# Patient Record
Sex: FEMALE | Race: WHITE | NOT HISPANIC OR LATINO | Employment: FULL TIME | URBAN - METROPOLITAN AREA
[De-identification: names, ages, dates, MRNs, and addresses within clinical notes are randomized per-mention and may not be internally consistent; named-entity substitution may affect disease eponyms.]

---

## 2024-07-25 ENCOUNTER — EVALUATION (OUTPATIENT)
Dept: PHYSICAL THERAPY | Facility: REHABILITATION | Age: 29
End: 2024-07-25
Payer: COMMERCIAL

## 2024-07-25 DIAGNOSIS — M54.12 CERVICAL RADICULOPATHY: Primary | ICD-10-CM

## 2024-07-25 DIAGNOSIS — M25.511 RIGHT SHOULDER PAIN, UNSPECIFIED CHRONICITY: ICD-10-CM

## 2024-07-25 PROCEDURE — 97161 PT EVAL LOW COMPLEX 20 MIN: CPT | Performed by: PHYSICAL THERAPIST

## 2024-07-25 PROCEDURE — 97110 THERAPEUTIC EXERCISES: CPT | Performed by: PHYSICAL THERAPIST
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
## 2024-07-30 ENCOUNTER — OFFICE VISIT (OUTPATIENT)
Dept: PHYSICAL THERAPY | Facility: REHABILITATION | Age: 29
End: 2024-07-30
Payer: COMMERCIAL

## 2024-07-30 DIAGNOSIS — M25.511 RIGHT SHOULDER PAIN, UNSPECIFIED CHRONICITY: ICD-10-CM

## 2024-07-30 DIAGNOSIS — M54.12 CERVICAL RADICULOPATHY: Primary | ICD-10-CM

## 2024-07-30 PROCEDURE — 97112 NEUROMUSCULAR REEDUCATION: CPT

## 2024-07-30 PROCEDURE — 97110 THERAPEUTIC EXERCISES: CPT

## 2024-07-30 PROCEDURE — 97140 MANUAL THERAPY 1/> REGIONS: CPT
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
## 2024-08-01 ENCOUNTER — OFFICE VISIT (OUTPATIENT)
Dept: PHYSICAL THERAPY | Facility: REHABILITATION | Age: 29
End: 2024-08-01
Payer: COMMERCIAL

## 2024-08-01 DIAGNOSIS — M25.511 RIGHT SHOULDER PAIN, UNSPECIFIED CHRONICITY: ICD-10-CM

## 2024-08-01 DIAGNOSIS — M54.12 CERVICAL RADICULOPATHY: Primary | ICD-10-CM

## 2024-08-01 PROCEDURE — 97110 THERAPEUTIC EXERCISES: CPT

## 2024-08-01 PROCEDURE — 97140 MANUAL THERAPY 1/> REGIONS: CPT

## 2024-08-01 PROCEDURE — 97112 NEUROMUSCULAR REEDUCATION: CPT
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
## 2024-08-05 ENCOUNTER — OFFICE VISIT (OUTPATIENT)
Dept: PHYSICAL THERAPY | Facility: REHABILITATION | Age: 29
End: 2024-08-05
Payer: COMMERCIAL

## 2024-08-05 DIAGNOSIS — M25.511 RIGHT SHOULDER PAIN, UNSPECIFIED CHRONICITY: ICD-10-CM

## 2024-08-05 DIAGNOSIS — M54.12 CERVICAL RADICULOPATHY: Primary | ICD-10-CM

## 2024-08-05 PROCEDURE — 97140 MANUAL THERAPY 1/> REGIONS: CPT | Performed by: PHYSICAL THERAPIST

## 2024-08-05 PROCEDURE — 97112 NEUROMUSCULAR REEDUCATION: CPT | Performed by: PHYSICAL THERAPIST

## 2024-08-05 PROCEDURE — 97110 THERAPEUTIC EXERCISES: CPT | Performed by: PHYSICAL THERAPIST
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
## 2024-08-08 ENCOUNTER — APPOINTMENT (OUTPATIENT)
Dept: PHYSICAL THERAPY | Facility: REHABILITATION | Age: 29
End: 2024-08-08
Payer: COMMERCIAL

## 2024-08-12 ENCOUNTER — OFFICE VISIT (OUTPATIENT)
Dept: PHYSICAL THERAPY | Facility: REHABILITATION | Age: 29
End: 2024-08-12
Payer: COMMERCIAL

## 2024-08-12 DIAGNOSIS — M25.511 RIGHT SHOULDER PAIN, UNSPECIFIED CHRONICITY: ICD-10-CM

## 2024-08-12 DIAGNOSIS — M54.12 CERVICAL RADICULOPATHY: Primary | ICD-10-CM

## 2024-08-12 PROCEDURE — 97110 THERAPEUTIC EXERCISES: CPT | Performed by: PHYSICAL THERAPIST

## 2024-08-12 PROCEDURE — 97140 MANUAL THERAPY 1/> REGIONS: CPT | Performed by: PHYSICAL THERAPIST
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
## 2024-08-15 ENCOUNTER — APPOINTMENT (OUTPATIENT)
Dept: PHYSICAL THERAPY | Facility: REHABILITATION | Age: 29
End: 2024-08-15
Payer: COMMERCIAL

## 2024-08-19 ENCOUNTER — OFFICE VISIT (OUTPATIENT)
Dept: PHYSICAL THERAPY | Facility: REHABILITATION | Age: 29
End: 2024-08-19
Payer: COMMERCIAL

## 2024-08-19 DIAGNOSIS — M25.511 RIGHT SHOULDER PAIN, UNSPECIFIED CHRONICITY: ICD-10-CM

## 2024-08-19 DIAGNOSIS — M54.12 CERVICAL RADICULOPATHY: Primary | ICD-10-CM

## 2024-08-19 PROCEDURE — 97110 THERAPEUTIC EXERCISES: CPT

## 2024-08-19 PROCEDURE — 97140 MANUAL THERAPY 1/> REGIONS: CPT

## 2024-08-19 PROCEDURE — 97112 NEUROMUSCULAR REEDUCATION: CPT
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
## 2024-08-22 ENCOUNTER — EVALUATION (OUTPATIENT)
Dept: PHYSICAL THERAPY | Facility: REHABILITATION | Age: 29
End: 2024-08-22
Payer: COMMERCIAL

## 2024-08-22 DIAGNOSIS — M54.12 CERVICAL RADICULOPATHY: Primary | ICD-10-CM

## 2024-08-22 DIAGNOSIS — M25.511 RIGHT SHOULDER PAIN, UNSPECIFIED CHRONICITY: ICD-10-CM

## 2024-08-22 PROCEDURE — 97140 MANUAL THERAPY 1/> REGIONS: CPT | Performed by: PHYSICAL THERAPIST
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
## 2024-08-26 ENCOUNTER — APPOINTMENT (OUTPATIENT)
Dept: PHYSICAL THERAPY | Facility: REHABILITATION | Age: 29
End: 2024-08-26
Payer: COMMERCIAL

## 2024-08-26 ENCOUNTER — OFFICE VISIT (OUTPATIENT)
Dept: OBGYN CLINIC | Facility: MEDICAL CENTER | Age: 29
End: 2024-08-26
Payer: COMMERCIAL

## 2024-08-26 VITALS
WEIGHT: 149 LBS | HEIGHT: 66 IN | SYSTOLIC BLOOD PRESSURE: 127 MMHG | HEART RATE: 92 BPM | BODY MASS INDEX: 23.95 KG/M2 | DIASTOLIC BLOOD PRESSURE: 85 MMHG

## 2024-08-26 DIAGNOSIS — M54.12 RADICULOPATHY, CERVICAL REGION: Primary | ICD-10-CM

## 2024-08-26 PROCEDURE — 20610 DRAIN/INJ JOINT/BURSA W/O US: CPT | Performed by: ORTHOPAEDIC SURGERY

## 2024-08-26 PROCEDURE — 99213 OFFICE O/P EST LOW 20 MIN: CPT | Performed by: ORTHOPAEDIC SURGERY

## 2024-08-26 RX ORDER — BUPIVACAINE HYDROCHLORIDE 2.5 MG/ML
2 INJECTION, SOLUTION INFILTRATION; PERINEURAL
Status: COMPLETED | OUTPATIENT
Start: 2024-08-26 | End: 2024-08-26

## 2024-08-26 RX ORDER — BUTALBITAL, ACETAMINOPHEN AND CAFFEINE 50; 325; 40 MG/1; MG/1; MG/1
1 TABLET ORAL
COMMUNITY
Start: 2024-08-15

## 2024-08-26 RX ORDER — METHYLPREDNISOLONE ACETATE 40 MG/ML
1 INJECTION, SUSPENSION INTRA-ARTICULAR; INTRALESIONAL; INTRAMUSCULAR; SOFT TISSUE
Status: COMPLETED | OUTPATIENT
Start: 2024-08-26 | End: 2024-08-26

## 2024-08-26 RX ORDER — ONDANSETRON 4 MG/1
TABLET, ORALLY DISINTEGRATING ORAL
COMMUNITY
Start: 2024-06-14

## 2024-08-26 RX ORDER — CYCLOBENZAPRINE HCL 5 MG
TABLET ORAL
COMMUNITY
Start: 2024-07-22

## 2024-08-26 RX ADMIN — METHYLPREDNISOLONE ACETATE 1 ML: 40 INJECTION, SUSPENSION INTRA-ARTICULAR; INTRALESIONAL; INTRAMUSCULAR; SOFT TISSUE at 12:45

## 2024-08-26 RX ADMIN — BUPIVACAINE HYDROCHLORIDE 2 ML: 2.5 INJECTION, SOLUTION INFILTRATION; PERINEURAL at 12:45

## 2024-08-26 NOTE — PROGRESS NOTES
Ortho Sports Medicine Shoulder Follow Up Visit     Assesment:   28 y.o. female right shoulder pain with cervical radiculopathy     Plan:    Conservative treatment:    Ice to shoulder 1-2 times daily, for 20 minutes at a time.  Continue PT  NSAIDs OTC PRN for pain   Referral placed for her to see one of our Spine and Pain providers regarding the cervical radiculopathy       Imaging:    All imaging from today was reviewed by myself and explained to the patient.       Injection:    The risks and benefits of the injection (which include but are not limited to: infection, bleeding,damage to nerve/artery, need for further intervention), as well as the risks and benefits of all alternative treatments were explained and understood.  The patient elected to proceed with injection. The procedure was done with aseptic technique, and the patient tolerated the procedure well with no complications.    A corticosteroid injection of the subacromial space was performed.    Ice to the shoulder 1-2 times daily for 20 minutes, for next 24-48 hrs.      Surgery:     No surgery is recommended at this point, continue with conservative treatment plan as noted.      Follow up:    Return for Spine and Pain provider .      Chief Complaint   Patient presents with    Right Shoulder - Follow-up         History of Present Illness:    The patient is returns for follow up of Right shoulder pain.  Since the prior visit, She reports some improvement.  Patient reports that following her appointment with at the last she did go to urgent care and received a course of steroids.  Patient states that while on her prednisone course she had complete resolution of right shoulder pain.  Patient states that as her taper started to wean down she could start to feel the return of the right shoulder pain.  Patient states that she continues to have pain about the posterior aspect pointing to her trap region.  Patient continues to go to physical therapy twice a week  and states that this is helping.  Patient reports that she has better shoulder range of motion than she did previously.  She also reports that a PT they do work on her muscles and before manipulations which are helpful as well.  Patient continues to have the pain radiating down the arm into her hand.  Patient also notes having numbness and tingling within the first second and third finger intermittently.  Patient states that typically when she starts to feel the numbness and tingling occur she can shake out her arm to make it resolved.    Pain is improved by rest, physical therapy, and prednisone course.  Pain is aggravated by overhead activity.       The patient denies weakness.       The patient has tried rest, ice, NSAIDS, physical therapy, and prednisone course.        Shoulder Surgical History:  None    Past Medical, Social and Family History:  Past Medical History:   Diagnosis Date    Anxiety     Depressed     Kidney stone     Kidney stones      History reviewed. No pertinent surgical history.  Allergies   Allergen Reactions    Bactrim [Sulfamethoxazole-Trimethoprim]      Current Outpatient Medications on File Prior to Visit   Medication Sig Dispense Refill    butalbital-acetaminophen-caffeine (FIORICET,ESGIC) -40 mg per tablet Take 1 tablet by mouth      cyclobenzaprine (FLEXERIL) 5 mg tablet TAKE 1-2 TABLETS BY MOUTH 3 TIMES A DAY AS NEEDED FOR MUSCLE SPASMS.      Diclofenac Sodium (VOLTAREN) 1 % Apply 4 g topically 4 (four) times a day      ondansetron (ZOFRAN-ODT) 4 mg disintegrating tablet       sertraline (ZOLOFT) 50 mg tablet Take 50 mg by mouth daily      Levonorgest-Eth Estrad 91-Day (AMETHIA LO PO) Take 1 tablet by mouth daily.      naproxen (NAPROSYN) 500 mg tablet Take 1 tablet (500 mg total) by mouth 2 (two) times a day with meals for 14 days 28 tablet 0    Norgestrel 0.075 MG TABS Take by mouth (Patient not taking: Reported on 8/26/2024)      oxyCODONE-acetaminophen (PERCOCET) 5-325 mg per  tablet Take 1 tablet by mouth every 4 (four) hours as needed for moderate pain for up to 10 doses Max Daily Amount: 6 tablets 10 tablet 0    Prenatal Multivit-Min-Fe-FA (PRENATAL 1 + IRON PO)       propranolol (INDERAL) 20 mg tablet       tamsulosin (FLOMAX) 0.4 mg Take 1 capsule by mouth daily with dinner for 7 doses 7 capsule 0    venlafaxine (EFFEXOR-XR) 75 mg 24 hr capsule Take 75 mg by mouth daily       No current facility-administered medications on file prior to visit.     Social History     Socioeconomic History    Marital status: /Civil Union     Spouse name: Not on file    Number of children: Not on file    Years of education: Not on file    Highest education level: Not on file   Occupational History    Not on file   Tobacco Use    Smoking status: Never    Smokeless tobacco: Not on file   Substance and Sexual Activity    Alcohol use: Yes     Comment: social    Drug use: No    Sexual activity: Not on file   Other Topics Concern    Not on file   Social History Narrative    Not on file     Social Determinants of Health     Financial Resource Strain: Low Risk  (8/14/2024)    Received from [facility]    Overall Financial Resource Strain (CARDIA)     Difficulty of Paying Living Expenses: Not hard at all   Food Insecurity: No Food Insecurity (8/14/2024)    Received from [facility]    Hunger Vital Sign     Worried About Running Out of Food in the Last Year: Never true     Ran Out of Food in the Last Year: Never true   Transportation Needs: No Transportation Needs (8/14/2024)    Received from [facility]    PRAPARE - Transportation     Lack of Transportation (Medical): No     Lack of Transportation (Non-Medical): No   Physical Activity: Not on file   Stress: No Stress Concern Present (8/14/2024)    Received from [facility]    [employer] - Occupational Stress Questionnaire     Feeling of Stress : Only a little  "  Social Connections: Feeling Socially Integrated (8/14/2024)    Received from [facility]    OASIS : Social Isolation     How often do you feel lonely or isolated from those around you?: Never   Intimate Partner Violence: Not At Risk (8/14/2024)    Received from [facility]    Humiliation, Afraid, Rape, and Kick questionnaire     Fear of Current or Ex-Partner: No     Emotionally Abused: No     Physically Abused: No     Sexually Abused: No   Housing Stability: Unknown (8/14/2024)    Received from [facility]    Housing Stability Vital Sign     Unable to Pay for Housing in the Last Year: No     Number of Times Moved in the Last Year: Not on file     Homeless in the Last Year: No       I have reviewed the past medical, surgical, social and family history, medications and allergies as documented in the EMR.    Review of systems: ROS is negative other than that noted in the HPI.  Constitutional: Negative for fatigue and fever.      Physical Exam:    Blood pressure 127/85, pulse 92, height 5' 6\" (1.676 m), weight 67.6 kg (149 lb), unknown if currently breastfeeding.    General/Constitutional: NAD, well developed, well nourished  HENT: Normocephalic, atraumatic  CV: Intact distal pulses, regular rate  Resp: No respiratory distress or labored breathing  GI: Soft and non-tender   Lymphatic: No lymphadenopathy palpated  Neuro: Alert and Oriented x 3, no focal deficits  Psych: Normal mood, normal affect, normal judgement, normal behavior  Skin: Warm, dry, no rashes, no erythema      Shoulder focused exam:       RIGHT LEFT    Scapula Atrophy Negative Negative     Winging Negative Negative     Protraction Negative Negative    Rotator cuff SS 5/5 5/5     IS 5/5 5/5     SubS 5/5 5/5    ROM     170     ER0 90 90                   IRb T6    T6    TTP: AC Negative Negative     Biceps Negative Negative     Coracoid Negative Negative    Special Tests: O'Briens Negative " "Negative     Baez-shear Negative Negative     Cross body Adduction Negative Negative     Speeds  Negative Negative     Deng's Negative Negative     Whipple Negative Negative       Neer Negative Negative     Chou Negative Negative    Instability: Apprehension & relocation not tested not tested     Load & shift not tested not tested    Other: Crank Negative Negative               UE NV Exam: +2 Radial pulses bilaterally  Sensation intact to light touch C5-T1 bilaterally, Radial/median/ulnar nerve motor intact    Cervical ROM is full without pain, numbness or tingling      Shoulder Imaging    No imaging was performed today    Large joint arthrocentesis: R subacromial bursa  Universal Protocol:  Consent given by: patient  Time out: Immediately prior to procedure a \"time out\" was called to verify the correct patient, procedure, equipment, support staff and site/side marked as required.  Timeout called at: 8/26/2024 1:21 PM.  Patient understanding: patient states understanding of the procedure being performed  Site marked: the operative site was marked  Patient identity confirmed: verbally with patient  Supporting Documentation  Indications: pain   Procedure Details  Location: shoulder - R subacromial bursa  Preparation: Patient was prepped and draped in the usual sterile fashion  Needle size: 22 G  Ultrasound guidance: no  Approach: posterior  Medications administered: 2 mL bupivacaine 0.25 %; 1 mL methylPREDNISolone acetate 40 mg/mL    Patient tolerance: patient tolerated the procedure well with no immediate complications  Dressing:  Sterile dressing applied            "

## 2024-08-29 ENCOUNTER — OFFICE VISIT (OUTPATIENT)
Dept: PHYSICAL THERAPY | Facility: REHABILITATION | Age: 29
End: 2024-08-29
Payer: COMMERCIAL

## 2024-08-29 DIAGNOSIS — M25.511 RIGHT SHOULDER PAIN, UNSPECIFIED CHRONICITY: ICD-10-CM

## 2024-08-29 DIAGNOSIS — M54.12 CERVICAL RADICULOPATHY: Primary | ICD-10-CM

## 2024-08-29 PROCEDURE — 97112 NEUROMUSCULAR REEDUCATION: CPT | Performed by: PHYSICAL THERAPIST

## 2024-08-29 PROCEDURE — 97110 THERAPEUTIC EXERCISES: CPT | Performed by: PHYSICAL THERAPIST
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
## 2024-09-09 ENCOUNTER — OFFICE VISIT (OUTPATIENT)
Dept: PHYSICAL THERAPY | Facility: REHABILITATION | Age: 29
End: 2024-09-09
Payer: COMMERCIAL

## 2024-09-09 DIAGNOSIS — M54.12 CERVICAL RADICULOPATHY: Primary | ICD-10-CM

## 2024-09-09 DIAGNOSIS — M25.511 RIGHT SHOULDER PAIN, UNSPECIFIED CHRONICITY: ICD-10-CM

## 2024-09-09 PROCEDURE — 97110 THERAPEUTIC EXERCISES: CPT

## 2024-09-09 PROCEDURE — 97112 NEUROMUSCULAR REEDUCATION: CPT
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
## 2024-09-12 ENCOUNTER — APPOINTMENT (OUTPATIENT)
Dept: PHYSICAL THERAPY | Facility: REHABILITATION | Age: 29
End: 2024-09-12
Payer: COMMERCIAL

## 2024-09-13 ENCOUNTER — OFFICE VISIT (OUTPATIENT)
Dept: PHYSICAL THERAPY | Facility: REHABILITATION | Age: 29
End: 2024-09-13
Payer: COMMERCIAL

## 2024-09-13 DIAGNOSIS — M25.511 RIGHT SHOULDER PAIN, UNSPECIFIED CHRONICITY: ICD-10-CM

## 2024-09-13 DIAGNOSIS — M54.12 CERVICAL RADICULOPATHY: Primary | ICD-10-CM

## 2024-09-13 PROCEDURE — 97140 MANUAL THERAPY 1/> REGIONS: CPT

## 2024-09-13 PROCEDURE — 97110 THERAPEUTIC EXERCISES: CPT
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
## 2024-09-16 ENCOUNTER — OFFICE VISIT (OUTPATIENT)
Dept: PHYSICAL THERAPY | Facility: REHABILITATION | Age: 29
End: 2024-09-16
Payer: COMMERCIAL

## 2024-09-16 DIAGNOSIS — M54.12 CERVICAL RADICULOPATHY: Primary | ICD-10-CM

## 2024-09-16 DIAGNOSIS — M25.511 RIGHT SHOULDER PAIN, UNSPECIFIED CHRONICITY: ICD-10-CM

## 2024-09-16 PROCEDURE — 97110 THERAPEUTIC EXERCISES: CPT

## 2024-09-16 PROCEDURE — 97140 MANUAL THERAPY 1/> REGIONS: CPT
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
## 2024-09-19 ENCOUNTER — APPOINTMENT (OUTPATIENT)
Dept: PHYSICAL THERAPY | Facility: REHABILITATION | Age: 29
End: 2024-09-19
Payer: COMMERCIAL

## 2024-09-23 ENCOUNTER — EVALUATION (OUTPATIENT)
Dept: PHYSICAL THERAPY | Facility: REHABILITATION | Age: 29
End: 2024-09-23
Payer: COMMERCIAL

## 2024-09-23 ENCOUNTER — APPOINTMENT (OUTPATIENT)
Dept: PHYSICAL THERAPY | Facility: REHABILITATION | Age: 29
End: 2024-09-23
Payer: COMMERCIAL

## 2024-09-23 DIAGNOSIS — M25.511 RIGHT SHOULDER PAIN, UNSPECIFIED CHRONICITY: ICD-10-CM

## 2024-09-23 DIAGNOSIS — M54.12 CERVICAL RADICULOPATHY: Primary | ICD-10-CM

## 2024-09-23 PROCEDURE — 97140 MANUAL THERAPY 1/> REGIONS: CPT

## 2024-09-23 PROCEDURE — 97110 THERAPEUTIC EXERCISES: CPT

## 2024-09-23 PROCEDURE — 97112 NEUROMUSCULAR REEDUCATION: CPT
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
## 2024-09-26 ENCOUNTER — EVALUATION (OUTPATIENT)
Dept: PHYSICAL THERAPY | Facility: REHABILITATION | Age: 29
End: 2024-09-26
Payer: COMMERCIAL

## 2024-09-26 DIAGNOSIS — M54.12 CERVICAL RADICULOPATHY: Primary | ICD-10-CM

## 2024-09-26 DIAGNOSIS — M25.511 RIGHT SHOULDER PAIN, UNSPECIFIED CHRONICITY: ICD-10-CM

## 2024-09-26 PROCEDURE — 97140 MANUAL THERAPY 1/> REGIONS: CPT | Performed by: PHYSICAL THERAPIST

## 2024-09-26 PROCEDURE — 97110 THERAPEUTIC EXERCISES: CPT

## 2024-09-26 PROCEDURE — 97140 MANUAL THERAPY 1/> REGIONS: CPT
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
## 2024-10-10 ENCOUNTER — OFFICE VISIT (OUTPATIENT)
Dept: PAIN MEDICINE | Facility: MEDICAL CENTER | Age: 29
End: 2024-10-10
Payer: COMMERCIAL

## 2024-10-10 VITALS
WEIGHT: 148 LBS | BODY MASS INDEX: 23.78 KG/M2 | SYSTOLIC BLOOD PRESSURE: 133 MMHG | HEIGHT: 66 IN | DIASTOLIC BLOOD PRESSURE: 90 MMHG | HEART RATE: 84 BPM
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
## 2024-10-10 DIAGNOSIS — M47.812 CERVICAL FACET JOINT SYNDROME: Primary | ICD-10-CM

## 2024-10-10 PROCEDURE — 99204 OFFICE O/P NEW MOD 45 MIN: CPT | Performed by: PHYSICAL MEDICINE & REHABILITATION
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
## 2024-11-19 ENCOUNTER — HOSPITAL ENCOUNTER (OUTPATIENT)
Dept: RADIOLOGY | Facility: MEDICAL CENTER | Age: 29
Discharge: HOME | End: 2024-11-19
Payer: COMMERCIAL

## 2024-11-19 VITALS
RESPIRATION RATE: 16 BRPM | TEMPERATURE: 98 F | DIASTOLIC BLOOD PRESSURE: 76 MMHG | SYSTOLIC BLOOD PRESSURE: 116 MMHG | OXYGEN SATURATION: 98 % | HEART RATE: 74 BPM

## 2024-11-19 DIAGNOSIS — M47.812 CERVICAL FACET JOINT SYNDROME: ICD-10-CM

## 2024-11-19 PROCEDURE — 64490 INJ PARAVERT F JNT C/T 1 LEV: CPT | Performed by: PHYSICAL MEDICINE & REHABILITATION

## 2024-11-19 PROCEDURE — 64491 INJ PARAVERT F JNT C/T 2 LEV: CPT | Performed by: PHYSICAL MEDICINE & REHABILITATION

## 2024-11-19 RX ADMIN — LIDOCAINE HYDROCHLORIDE 1.5 ML: 20 INJECTION, SOLUTION EPIDURAL; INFILTRATION; INTRACAUDAL; PERINEURAL at 16:30

## 2024-11-19 NOTE — H&P
History of Present Illness: The patient is a 29 y.o. female who presents with complaints of right neck pain    Past Medical History:   Diagnosis Date    Anxiety     Depressed     Hypertension     Kidney stone     Kidney stones        No past surgical history on file.      Current Outpatient Medications:     butalbital-acetaminophen-caffeine (FIORICET,ESGIC) -40 mg per tablet, Take 1 tablet by mouth (Patient not taking: Reported on 10/10/2024), Disp: , Rfl:     cyclobenzaprine (FLEXERIL) 5 mg tablet, TAKE 1-2 TABLETS BY MOUTH 3 TIMES A DAY AS NEEDED FOR MUSCLE SPASMS. (Patient not taking: Reported on 10/10/2024), Disp: , Rfl:     Diclofenac Sodium (VOLTAREN) 1 %, Apply 4 g topically 4 (four) times a day (Patient not taking: Reported on 10/10/2024), Disp: , Rfl:     naproxen (NAPROSYN) 500 mg tablet, Take 1 tablet (500 mg total) by mouth 2 (two) times a day with meals for 14 days, Disp: 28 tablet, Rfl: 0    Norgestrel 0.075 MG TABS, Take by mouth (Patient not taking: Reported on 8/26/2024), Disp: , Rfl:     ondansetron (ZOFRAN-ODT) 4 mg disintegrating tablet, , Disp: , Rfl:     oxyCODONE-acetaminophen (PERCOCET) 5-325 mg per tablet, Take 1 tablet by mouth every 4 (four) hours as needed for moderate pain for up to 10 doses Max Daily Amount: 6 tablets, Disp: 10 tablet, Rfl: 0    Prenatal Multivit-Min-Fe-FA (PRENATAL 1 + IRON PO), , Disp: , Rfl:     propranolol (INDERAL) 20 mg tablet, , Disp: , Rfl:     sertraline (ZOLOFT) 50 mg tablet, Take 50 mg by mouth daily, Disp: , Rfl:     tamsulosin (FLOMAX) 0.4 mg, Take 1 capsule by mouth daily with dinner for 7 doses, Disp: 7 capsule, Rfl: 0    venlafaxine (EFFEXOR-XR) 75 mg 24 hr capsule, Take 75 mg by mouth daily, Disp: , Rfl:     Current Facility-Administered Medications:     lidocaine (PF) (XYLOCAINE-MPF) 2 % injection 1.5 mL, 1.5 mL, Perineural, Once, Sarbjit Hou, DO    Allergies   Allergen Reactions    Bactrim [Sulfamethoxazole-Trimethoprim]        Physical Exam:    Vitals:    11/19/24 1618   BP: 129/87   Pulse: 66   Resp: 16   Temp: 98 °F (36.7 °C)   SpO2: 98%     General: Awake, Alert, Oriented x 3, Mood and affect appropriate  Respiratory: Respirations even and unlabored  Cardiovascular: Peripheral pulses intact; no edema  Musculoskeletal Exam: right neck pain    ASA Score: 1    Patient/Chart Verification  Patient ID Verified: Verbal  Consents Confirmed: Procedural, To be obtained in the Pre-Procedure area  H&P( within 30 days) Verified: To be obtained in the Procedural area  Allergies Reviewed: Yes  Anticoag/NSAID held?: NA  Currently on antibiotics?: No  Pregnancy denied?: Yes    Assessment:   1. Cervical facet joint syndrome        Plan: (R) C4-6 MBB#1

## 2024-11-19 NOTE — DISCHARGE INSTRUCTIONS

## 2024-11-20 ENCOUNTER — TELEPHONE (OUTPATIENT)
Dept: RADIOLOGY | Facility: MEDICAL CENTER | Age: 29
End: 2024-11-20

## 2024-11-21 NOTE — TELEPHONE ENCOUNTER
Procedure scheduled 12/17/24    Reviewed instructions: , NPO 1 hour prior, loose-fitting/comfortable clothes, if ill/fever/infx/abx to call and reschedule.  Also pain level at leat 5/10 and refrain from PRN, as-needed pain meds 6h prior.  Patient stated verbal understanding.

## 2024-12-16 ENCOUNTER — TELEPHONE (OUTPATIENT)
Dept: RADIOLOGY | Facility: MEDICAL CENTER | Age: 29
End: 2024-12-16

## 2024-12-16 ENCOUNTER — TELEPHONE (OUTPATIENT)
Age: 29
End: 2024-12-16

## 2024-12-16 NOTE — TELEPHONE ENCOUNTER
Caller: Pt    Doctor: Ameya    Reason for call: Pt would like to reschedule injection procedure for tomorrow. Pt states stomach bug is present in household.    Please assist.     Call back#: [phone]

## 2024-12-17 NOTE — TELEPHONE ENCOUNTER
Procedure rescheduled to 1/14/25    Reviewed instructions: , NPO 1 hour prior, loose-fitting/comfortable clothes, if ill/fever/infx/abx to call and reschedule.  Also pain level at leat 5/10 and refrain from PRN, as-needed pain meds 6h prior.  Patient stated verbal understanding.

## 2025-01-14 ENCOUNTER — HOSPITAL ENCOUNTER (OUTPATIENT)
Dept: RADIOLOGY | Facility: MEDICAL CENTER | Age: 30
Discharge: HOME | End: 2025-01-14
Payer: COMMERCIAL

## 2025-01-14 VITALS
DIASTOLIC BLOOD PRESSURE: 99 MMHG | RESPIRATION RATE: 20 BRPM | HEART RATE: 77 BPM | TEMPERATURE: 98.3 F | SYSTOLIC BLOOD PRESSURE: 148 MMHG | OXYGEN SATURATION: 100 %

## 2025-01-14 DIAGNOSIS — M47.812 CERVICAL SPONDYLOSIS: ICD-10-CM

## 2025-01-14 PROCEDURE — 64491 INJ PARAVERT F JNT C/T 2 LEV: CPT | Performed by: PHYSICAL MEDICINE & REHABILITATION

## 2025-01-14 PROCEDURE — 64490 INJ PARAVERT F JNT C/T 1 LEV: CPT | Performed by: PHYSICAL MEDICINE & REHABILITATION

## 2025-01-14 RX ORDER — BUPIVACAINE HYDROCHLORIDE 5 MG/ML
1.5 INJECTION, SOLUTION EPIDURAL; INTRACAUDAL ONCE
Status: COMPLETED | OUTPATIENT
Start: 2025-01-14 | End: 2025-01-14

## 2025-01-14 RX ADMIN — BUPIVACAINE HYDROCHLORIDE 1.5 ML: 5 INJECTION, SOLUTION EPIDURAL; INTRACAUDAL; PERINEURAL at 16:17

## 2025-01-14 NOTE — DISCHARGE INSTRUCTIONS

## 2025-01-14 NOTE — H&P
History of Present Illness: The patient is a 29 y.o. female who presents with complaints of right neck pain    Past Medical History:   Diagnosis Date    Anxiety     Depressed     Hypertension     Kidney stone     Kidney stones        No past surgical history on file.      Current Outpatient Medications:     butalbital-acetaminophen-caffeine (FIORICET,ESGIC) -40 mg per tablet, Take 1 tablet by mouth (Patient not taking: Reported on 10/10/2024), Disp: , Rfl:     cyclobenzaprine (FLEXERIL) 5 mg tablet, TAKE 1-2 TABLETS BY MOUTH 3 TIMES A DAY AS NEEDED FOR MUSCLE SPASMS. (Patient not taking: Reported on 10/10/2024), Disp: , Rfl:     Diclofenac Sodium (VOLTAREN) 1 %, Apply 4 g topically 4 (four) times a day (Patient not taking: Reported on 10/10/2024), Disp: , Rfl:     naproxen (NAPROSYN) 500 mg tablet, Take 1 tablet (500 mg total) by mouth 2 (two) times a day with meals for 14 days, Disp: 28 tablet, Rfl: 0    Norgestrel 0.075 MG TABS, Take by mouth (Patient not taking: Reported on 8/26/2024), Disp: , Rfl:     ondansetron (ZOFRAN-ODT) 4 mg disintegrating tablet, , Disp: , Rfl:     oxyCODONE-acetaminophen (PERCOCET) 5-325 mg per tablet, Take 1 tablet by mouth every 4 (four) hours as needed for moderate pain for up to 10 doses Max Daily Amount: 6 tablets, Disp: 10 tablet, Rfl: 0    Prenatal Multivit-Min-Fe-FA (PRENATAL 1 + IRON PO), , Disp: , Rfl:     propranolol (INDERAL) 20 mg tablet, , Disp: , Rfl:     sertraline (ZOLOFT) 50 mg tablet, Take 50 mg by mouth daily, Disp: , Rfl:     tamsulosin (FLOMAX) 0.4 mg, Take 1 capsule by mouth daily with dinner for 7 doses, Disp: 7 capsule, Rfl: 0    venlafaxine (EFFEXOR-XR) 75 mg 24 hr capsule, Take 75 mg by mouth daily, Disp: , Rfl:     Current Facility-Administered Medications:     bupivacaine (PF) (MARCAINE) 0.5 % injection 1.5 mL, 1.5 mL, Injection, Once, Sarbjit Hou, DO    Allergies   Allergen Reactions    Bactrim [Sulfamethoxazole-Trimethoprim]        Physical Exam:    Vitals:    01/14/25 1609   BP: 137/93   Pulse: 79   Resp: 20   Temp: 98.3 °F (36.8 °C)   SpO2: 100%     General: Awake, Alert, Oriented x 3, Mood and affect appropriate  Respiratory: Respirations even and unlabored  Cardiovascular: Peripheral pulses intact; no edema  Musculoskeletal Exam: right neck pain    ASA Score: 1    Patient/Chart Verification  Patient ID Verified: Verbal  ID Band Applied: No  Consents Confirmed: To be obtained in the Procedural area  H&P( within 30 days) Verified: To be obtained in the Procedural area  Interval H&P(within 24 hr) Complete (required for Outpatients and Surgery Admit only): To be obtained in the Procedural area  Allergies Reviewed: Yes  Anticoag/NSAID held?: NA  Currently on antibiotics?: No  Pregnancy denied?: Yes    Assessment:   1. Cervical spondylosis        Plan: RT C4-6 MBB #2 (78217, [id number])

## 2025-01-15 ENCOUNTER — RESULTS FOLLOW-UP (OUTPATIENT)
Dept: PAIN MEDICINE | Facility: MEDICAL CENTER | Age: 30
End: 2025-01-15

## 2025-01-15 ENCOUNTER — TELEPHONE (OUTPATIENT)
Dept: RADIOLOGY | Facility: MEDICAL CENTER | Age: 30
End: 2025-01-15

## 2025-01-16 NOTE — TELEPHONE ENCOUNTER
Dr Hou would you please prescribe medication to help pt relax for pt to take prior to her upcoming RFA

## 2025-01-16 NOTE — TELEPHONE ENCOUNTER
Procedure scheduled 1/31/25    Reviewed instructions: , NPO 1 hour prior, loose-fitting/comfortable clothing, if ill/fever/infx/abx to call and reschedule.  No need to hold any meds prior.  Patient stated verbal understanding.      Patient is requesting a prescription for anxiety be sent to her pharmacy prior to procedure date.  Can you please assist.

## 2025-01-27 DIAGNOSIS — F41.9 ANXIETY DUE TO INVASIVE PROCEDURE: Primary | ICD-10-CM

## 2025-01-27 RX ORDER — DIAZEPAM 5 MG/1
5 TABLET ORAL
Qty: 2 TABLET | Refills: 0 | Status: SHIPPED | OUTPATIENT
Start: 2025-01-27

## 2025-01-31 ENCOUNTER — HOSPITAL ENCOUNTER (OUTPATIENT)
Dept: RADIOLOGY | Facility: MEDICAL CENTER | Age: 30
End: 2025-01-31
Payer: COMMERCIAL

## 2025-01-31 ENCOUNTER — TELEPHONE (OUTPATIENT)
Dept: PAIN MEDICINE | Facility: MEDICAL CENTER | Age: 30
End: 2025-01-31

## 2025-01-31 VITALS
TEMPERATURE: 97.9 F | RESPIRATION RATE: 16 BRPM | DIASTOLIC BLOOD PRESSURE: 92 MMHG | HEART RATE: 83 BPM | OXYGEN SATURATION: 99 % | SYSTOLIC BLOOD PRESSURE: 141 MMHG

## 2025-01-31 DIAGNOSIS — M47.812 CERVICAL SPONDYLOSIS: ICD-10-CM

## 2025-01-31 PROCEDURE — 64633 DESTROY CERV/THOR FACET JNT: CPT | Performed by: PHYSICAL MEDICINE & REHABILITATION

## 2025-01-31 PROCEDURE — 64634 DESTROY C/TH FACET JNT ADDL: CPT | Performed by: PHYSICAL MEDICINE & REHABILITATION

## 2025-01-31 RX ADMIN — LIDOCAINE HYDROCHLORIDE 5 ML: 20 INJECTION, SOLUTION EPIDURAL; INFILTRATION; INTRACAUDAL; PERINEURAL at 11:50

## 2025-01-31 NOTE — DISCHARGE INSTR - LAB

## 2025-01-31 NOTE — H&P
History of Present Illness: The patient is a 29 y.o. female who presents with complaints of right neck pain    Past Medical History:   Diagnosis Date    Anxiety     Depressed     Hypertension     Kidney stone     Kidney stones        No past surgical history on file.      Current Outpatient Medications:     butalbital-acetaminophen-caffeine (FIORICET,ESGIC) -40 mg per tablet, Take 1 tablet by mouth (Patient not taking: Reported on 10/10/2024), Disp: , Rfl:     cyclobenzaprine (FLEXERIL) 5 mg tablet, TAKE 1-2 TABLETS BY MOUTH 3 TIMES A DAY AS NEEDED FOR MUSCLE SPASMS. (Patient not taking: Reported on 10/10/2024), Disp: , Rfl:     diazepam (VALIUM) 5 mg tablet, Take 1 tablet (5 mg total) by mouth 60 minutes pre-procedure, Disp: 2 tablet, Rfl: 0    Diclofenac Sodium (VOLTAREN) 1 %, Apply 4 g topically 4 (four) times a day (Patient not taking: Reported on 10/10/2024), Disp: , Rfl:     naproxen (NAPROSYN) 500 mg tablet, Take 1 tablet (500 mg total) by mouth 2 (two) times a day with meals for 14 days, Disp: 28 tablet, Rfl: 0    Norgestrel 0.075 MG TABS, Take by mouth (Patient not taking: Reported on 8/26/2024), Disp: , Rfl:     ondansetron (ZOFRAN-ODT) 4 mg disintegrating tablet, , Disp: , Rfl:     oxyCODONE-acetaminophen (PERCOCET) 5-325 mg per tablet, Take 1 tablet by mouth every 4 (four) hours as needed for moderate pain for up to 10 doses Max Daily Amount: 6 tablets, Disp: 10 tablet, Rfl: 0    Prenatal Multivit-Min-Fe-FA (PRENATAL 1 + IRON PO), , Disp: , Rfl:     propranolol (INDERAL) 20 mg tablet, , Disp: , Rfl:     sertraline (ZOLOFT) 50 mg tablet, Take 50 mg by mouth daily, Disp: , Rfl:     tamsulosin (FLOMAX) 0.4 mg, Take 1 capsule by mouth daily with dinner for 7 doses, Disp: 7 capsule, Rfl: 0    venlafaxine (EFFEXOR-XR) 75 mg 24 hr capsule, Take 75 mg by mouth daily, Disp: , Rfl:     Current Facility-Administered Medications:     lidocaine (PF) (XYLOCAINE-MPF) 2 % injection 5 mL, 5 mL, Perineural, Once,  Sarbjit Hou, DO    Allergies   Allergen Reactions    Bactrim [Sulfamethoxazole-Trimethoprim]        Physical Exam:   Vitals:    01/31/25 1136   BP: 130/87   Pulse: 71   Resp: 16   Temp: 97.9 °F (36.6 °C)   SpO2: 100%     General: Awake, Alert, Oriented x 3, Mood and affect appropriate  Respiratory: Respirations even and unlabored  Cardiovascular: Peripheral pulses intact; no edema  Musculoskeletal Exam: right neck pain    ASA Score: 2    Patient/Chart Verification  Patient ID Verified: Verbal  Consents Confirmed: To be obtained in the Procedural area  H&P( within 30 days) Verified: To be obtained in the Procedural area  Allergies Reviewed: Yes  Anticoag/NSAID held?: NA  Currently on antibiotics?: No  Pregnancy denied?: Yes  Does Patient Have a Prosthetic Device/Implant: No    Assessment:   1. Cervical spondylosis        Plan: RT C4-6 RFA (07831, [id number])

## 2025-02-03 NOTE — TELEPHONE ENCOUNTER
"  Pt did well over the weekend, the only thing was she developed a red raised pruritic, per pt \"inflamed\" rash from neck to below shoulder blades  Pt did take benadryl on Saturday and now the redness is gone and she just has a bumpy itchy rash left over.  RN advised could have been from the cleaning solution that was used to clean her neck and upper back prior to the procedure.  Pt did well over night no s/s of infection or sunburn sensation.  Aware it takes 2 weeks to notice pain relief and 4-6 weeks for full pain effect to be achieved.  Aware to medicate as previous for discomfort and may use ice or heat.  Current pain level? 0/10  Confirmed next appt.  Call if any questions or concerns prior to next appt.    Please see previous  "

## 2025-02-05 ENCOUNTER — RESULTS FOLLOW-UP (OUTPATIENT)
Dept: PAIN MEDICINE | Facility: MEDICAL CENTER | Age: 30
End: 2025-02-05

## 2025-02-05 DIAGNOSIS — R21 RASH: Primary | ICD-10-CM

## 2025-02-05 RX ORDER — METHYLPREDNISOLONE 4 MG/1
TABLET ORAL
Qty: 21 TABLET | Refills: 0 | Status: SHIPPED | OUTPATIENT
Start: 2025-02-05

## 2025-03-20 ENCOUNTER — OFFICE VISIT (OUTPATIENT)
Dept: PAIN MEDICINE | Facility: MEDICAL CENTER | Age: 30
End: 2025-03-20
Payer: COMMERCIAL

## 2025-03-20 VITALS — HEIGHT: 66 IN | WEIGHT: 142 LBS | BODY MASS INDEX: 22.82 KG/M2

## 2025-03-20 DIAGNOSIS — M54.81 OCCIPITAL NEURALGIA OF RIGHT SIDE: Primary | ICD-10-CM

## 2025-03-20 DIAGNOSIS — R20.0 RIGHT ARM NUMBNESS: ICD-10-CM

## 2025-03-20 PROCEDURE — 99214 OFFICE O/P EST MOD 30 MIN: CPT | Performed by: PHYSICAL MEDICINE & REHABILITATION

## 2025-03-20 NOTE — PROGRESS NOTES
Assessment  1. Occipital neuralgia of right side    2. Right arm numbness        Plan  At this time schedule for right greater occipital nerve block under ultrasound guidance.  If she continues with upper extremity symptoms would recommend right upper extremity EMG when she is ready.    My impressions and treatment recommendations were discussed in detail with the patient who verbalized understanding and had no further questions.  Discharge instructions were provided. I personally saw and examined the patient and I agree with the above discussed plan of care.    No orders of the defined types were placed in this encounter.    No orders of the defined types were placed in this encounter.      History of Present Illness    Carlyn Treadwell is a 29 y.o. female who returns in follow-up after radiofrequency ablation.  She has had some improvement but continues with headaches and an occipital nerve distribution on the right.  She rates the pain as a 5/10 which is constant.  She is noticing some stiffness and slight throbbing as well and describes dull aching numbness pins-and-needles type sensations.    I have personally reviewed and/or updated the patient's past medical history, past surgical history, family history, social history, current medications, allergies, and vital signs today.     Review of Systems   Constitutional:  Negative for fatigue.   HENT:  Negative for ear pain, hearing loss and sinus pressure.    Eyes:  Negative for pain.   Respiratory:  Negative for wheezing.    Cardiovascular:  Negative for palpitations.   Gastrointestinal:  Positive for nausea. Negative for abdominal pain.   Endocrine: Negative for polyuria.   Genitourinary:  Negative for frequency.   Musculoskeletal:  Positive for arthralgias, gait problem, myalgias, neck pain and neck stiffness. Negative for back pain.   Skin:  Negative for rash.   Neurological:  Positive for dizziness, weakness, light-headedness, numbness and headaches.    Psychiatric/Behavioral:  Negative for dysphoric mood and sleep disturbance.        Patient Active Problem List   Diagnosis    Chronic hypertension affecting pregnancy    Vaccine counseling    Anxiety    Previous pregnancy affected by small for gestational age fetus in first trimester, antepartum    Previous pregnancy complicated by pregnancy-induced hypertension in first trimester, antepartum    Family history of congenital anomalies    Cervical spondylosis       Past Medical History:   Diagnosis Date    Anxiety     Depressed     Hypertension     Kidney stone     Kidney stones        History reviewed. No pertinent surgical history.    Family History   Problem Relation Age of Onset    Diabetes Mother     Hypertension Mother     Seizures Sister        Social History     Occupational History    Not on file   Tobacco Use    Smoking status: Never    Smokeless tobacco: Never   Substance and Sexual Activity    Alcohol use: Not Currently     Comment: social    Drug use: No    Sexual activity: Yes     Partners: Male     Birth control/protection: Male Sterilization       Current Outpatient Medications on File Prior to Visit   Medication Sig    diazepam (VALIUM) 5 mg tablet Take 1 tablet (5 mg total) by mouth 60 minutes pre-procedure    methylPREDNISolone 4 MG tablet therapy pack Use as directed on package    sertraline (ZOLOFT) 50 mg tablet Take 50 mg by mouth daily    butalbital-acetaminophen-caffeine (FIORICET,ESGIC) -40 mg per tablet Take 1 tablet by mouth (Patient not taking: Reported on 3/20/2025)    cyclobenzaprine (FLEXERIL) 5 mg tablet TAKE 1-2 TABLETS BY MOUTH 3 TIMES A DAY AS NEEDED FOR MUSCLE SPASMS. (Patient not taking: Reported on 3/20/2025)    Diclofenac Sodium (VOLTAREN) 1 % Apply 4 g topically 4 (four) times a day (Patient not taking: Reported on 3/20/2025)    naproxen (NAPROSYN) 500 mg tablet Take 1 tablet (500 mg total) by mouth 2 (two) times a day with meals for 14 days    Norgestrel 0.075 MG TABS  "Take by mouth (Patient not taking: Reported on 8/26/2024)    ondansetron (ZOFRAN-ODT) 4 mg disintegrating tablet  (Patient not taking: Reported on 3/20/2025)    oxyCODONE-acetaminophen (PERCOCET) 5-325 mg per tablet Take 1 tablet by mouth every 4 (four) hours as needed for moderate pain for up to 10 doses Max Daily Amount: 6 tablets    Prenatal Multivit-Min-Fe-FA (PRENATAL 1 + IRON PO)     propranolol (INDERAL) 20 mg tablet     tamsulosin (FLOMAX) 0.4 mg Take 1 capsule by mouth daily with dinner for 7 doses    venlafaxine (EFFEXOR-XR) 75 mg 24 hr capsule Take 75 mg by mouth daily     No current facility-administered medications on file prior to visit.       Allergies   Allergen Reactions    Bactrim [Sulfamethoxazole-Trimethoprim]        Physical Exam    Ht 5' 6\" (1.676 m)   Wt 64.4 kg (142 lb)   BMI 22.92 kg/m²     Constitutional: normal, well developed, well nourished, alert, in no distress and non-toxic and no overt pain behavior.  Eyes: anicteric  HEENT: grossly intact  Neck: supple, symmetric, trachea midline and no masses   Pulmonary:even and unlabored  Cardiovascular:No edema or pitting edema present  Skin:Normal without rashes or lesions and well hydrated  Psychiatric:Mood and affect appropriate  Neurologic:Cranial Nerves II-XII grossly intact  Musculoskeletal:normal, except for pain in an occipital nerve distribution on the right    Imaging  "

## 2025-03-27 ENCOUNTER — PROCEDURE VISIT (OUTPATIENT)
Dept: PAIN MEDICINE | Facility: MEDICAL CENTER | Age: 30
End: 2025-03-27
Payer: COMMERCIAL

## 2025-03-27 VITALS — HEIGHT: 66 IN | BODY MASS INDEX: 22.82 KG/M2 | WEIGHT: 142 LBS

## 2025-03-27 DIAGNOSIS — M54.81 OCCIPITAL NEURALGIA OF RIGHT SIDE: Primary | ICD-10-CM

## 2025-03-27 PROCEDURE — 64405 NJX AA&/STRD GR OCPL NRV: CPT | Performed by: PHYSICAL MEDICINE & REHABILITATION

## 2025-03-27 PROCEDURE — 76942 ECHO GUIDE FOR BIOPSY: CPT | Performed by: PHYSICAL MEDICINE & REHABILITATION

## 2025-03-27 RX ORDER — ALBUTEROL SULFATE 90 UG/1
1-2 INHALANT RESPIRATORY (INHALATION) EVERY 6 HOURS PRN
COMMUNITY
Start: 2025-03-05 | End: 2025-04-04

## 2025-03-27 RX ORDER — BUPIVACAINE HYDROCHLORIDE 2.5 MG/ML
10 INJECTION, SOLUTION EPIDURAL; INFILTRATION; INTRACAUDAL; PERINEURAL ONCE
Status: COMPLETED | OUTPATIENT
Start: 2025-03-27 | End: 2025-03-27

## 2025-03-27 RX ORDER — OSELTAMIVIR PHOSPHATE 75 MG/1
75 CAPSULE ORAL 2 TIMES DAILY
COMMUNITY
Start: 2025-03-05

## 2025-03-27 RX ORDER — METHYLPREDNISOLONE ACETATE 40 MG/ML
40 INJECTION, SUSPENSION INTRA-ARTICULAR; INTRALESIONAL; INTRAMUSCULAR; SOFT TISSUE ONCE
Status: COMPLETED | OUTPATIENT
Start: 2025-03-27 | End: 2025-03-27

## 2025-03-27 RX ADMIN — BUPIVACAINE HYDROCHLORIDE 10 ML: 2.5 INJECTION, SOLUTION EPIDURAL; INFILTRATION; INTRACAUDAL; PERINEURAL at 13:47

## 2025-03-27 RX ADMIN — METHYLPREDNISOLONE ACETATE 40 MG: 40 INJECTION, SUSPENSION INTRA-ARTICULAR; INTRALESIONAL; INTRAMUSCULAR; SOFT TISSUE at 13:47

## 2025-03-27 NOTE — PROGRESS NOTES
Indication: Occipital headaches.   Preoperative diagnosis: Greater occipital neuralgia.  Postoperative diagnosis: Greater occipital neuralgia.  Procedure: Ultrasound-guided right greater occipital nerve block.     After discussing the risks, benefits, and alternatives to the procedure, the patient expressed understanding and wished to proceed. The patient was brought to the procedure suite and placed in the prone position. A procedural pause was conducted to verify: correct patient identity, procedure to be performed and as applicable, correct side and site, correct patient position, and availability of implants, special equipment or special requirements. A simple surgical tray was used. Prior to the procedure, the upper cervical spine was examined with a 12 MHz linear transducer to visualize the spinous process, suboccipital musculature, greater occipital nerve, occipital artery, and to determine the optimal needle path. Following this, the area was prepared with a alcohol scrub, then re-examined using the same transducer, a sterile ultrasound transducer cover, and sterile ultrasound transducer gel. Thereafter, using continuous ultrasound guidance, a 2.5-inch 25-gauge needle was advanced in close proximity to the greater occipital nerve. After visualization of the tip and negative aspiration for blood, a mixture of 20 mg of Depo-Medrol in 2 cc of 0.25% bupivacaine was injected into the target site. The patient tolerated the procedure well and there were no apparent complications. After an appropriate amount of observation, the patient was dismissed from the clinic in good condition under their own power.

## 2025-03-31 PROBLEM — M54.81 OCCIPITAL NEURALGIA OF RIGHT SIDE: Status: ACTIVE | Noted: 2025-03-31

## 2025-04-10 ENCOUNTER — TELEPHONE (OUTPATIENT)
Dept: PAIN MEDICINE | Facility: CLINIC | Age: 30
End: 2025-04-10

## 2025-04-16 ENCOUNTER — TELEPHONE (OUTPATIENT)
Age: 30
End: 2025-04-16

## 2025-04-16 DIAGNOSIS — M79.2 NEUROPATHIC PAIN: Primary | ICD-10-CM

## 2025-04-16 RX ORDER — PREGABALIN 75 MG/1
75 CAPSULE ORAL 2 TIMES DAILY
Qty: 60 CAPSULE | Refills: 1 | Status: SHIPPED | OUTPATIENT
Start: 2025-04-16 | End: 2025-06-15

## 2025-04-16 NOTE — TELEPHONE ENCOUNTER
PA for PREGABALIN 75 MG  APPROVED     Date(s) approved UNTIL 04/15/2026    Case #    Patient advised by          [x]MyChart Message  []Phone call   []LMOM  []L/M to call office as no active Communication consent on file  []Unable to leave detailed message as VM not approved on Communication consent       Pharmacy advised by    [x]Fax  []Phone call  []Secure Chat    Specialty Pharmacy    []     Approval letter scanned into Media No WILL SCAN UPON RECEIPT

## 2025-04-30 ENCOUNTER — OFFICE VISIT (OUTPATIENT)
Dept: PAIN MEDICINE | Facility: MEDICAL CENTER | Age: 30
End: 2025-04-30
Payer: COMMERCIAL

## 2025-04-30 VITALS — BODY MASS INDEX: 22.82 KG/M2 | HEIGHT: 66 IN | WEIGHT: 142 LBS

## 2025-04-30 DIAGNOSIS — R20.0 RIGHT ARM NUMBNESS: Primary | ICD-10-CM

## 2025-04-30 DIAGNOSIS — M47.812 CERVICAL FACET JOINT SYNDROME: ICD-10-CM

## 2025-04-30 DIAGNOSIS — M54.81 OCCIPITAL NEURALGIA OF RIGHT SIDE: ICD-10-CM

## 2025-04-30 DIAGNOSIS — M79.2 NEUROPATHIC PAIN: ICD-10-CM

## 2025-04-30 PROCEDURE — 99214 OFFICE O/P EST MOD 30 MIN: CPT

## 2025-04-30 RX ORDER — GABAPENTIN 300 MG/1
CAPSULE ORAL
Qty: 90 CAPSULE | Refills: 1 | Status: SHIPPED | OUTPATIENT
Start: 2025-04-30

## 2025-04-30 RX ORDER — RIZATRIPTAN BENZOATE 10 MG/1
TABLET, ORALLY DISINTEGRATING ORAL
COMMUNITY
Start: 2025-04-17

## 2025-04-30 NOTE — PROGRESS NOTES
Assessment:  1. Right arm numbness    2. Cervical facet joint syndrome    3. Occipital neuralgia of right side    4. Neuropathic pain        Plan:  At this time, I believe it is medically necessary to have patient undergo a right upper extremity EMG for her continued upper extremity symptoms.  Patient agreeable.  Right upper extremity EMG ordered.    Patient instructed to discontinue the use of Lyrica at this time.    I discussed with the patient that I felt a medication such as gabapentin would be helpful in treating their pain. I discussed with the patient the type of medication it is, how it works, and that it requires a titration process that is specific to each individual. I reviewed with the patient that is may take 3-4 weeks for the medication's effects to be noticed and that is should never be abruptly stopped. Possible side effects include but are not limited to; vertigo, lethargy, nausea, and edema of the extremities. Advised the patient to call our office if they experience any side effects. The patient verbalized an understanding.    Follow-up in 1 month, or sooner if needed.    My impressions and treatment recommendations were discussed in detail with the patient who verbalized understanding and had no further questions.  Discharge instructions were provided. I personally saw and examined the patient and I agree with the above discussed plan of care.    Orders Placed This Encounter   Procedures    EMG     Standing Status:   Future     Expiration Date:   4/30/2026     Does the patient have an external cardiac device (LVAD)?:   No     Did the onset of symptoms occur more than 2 weeks ago from today?:   Yes     Affected Location (Up to 2 Per Order)::   Upper Right Limb     Reason for Exam::   cervical neuropathy     New Medications Ordered This Visit   Medications    rizatriptan (MAXALT-MLT) 10 mg disintegrating tablet     Sig: TAKE 1 TAB AT ONSET OF MIGRAINE , REPEAT IN 2 HOURS IF NEEDED, LIMIT 2 TAB IN  24 HOURS    gabapentin (NEURONTIN) 300 mg capsule     Sig: Take 1 capsule by mouth at bedtime x 3 days.  Then take 1 capsule by mouth twice daily x 3 days.  Then take 1 capsule by mouth 3 times daily.     Dispense:  90 capsule     Refill:  1       History of Present Illness:  Carlyn Treadwell is a 29 y.o. female who presents for a follow up office visit after undergoing right greater occipital nerve block on 3/27/2025.  Patient reports experiencing 80% symptom relief following this recent injection.  Patient notes this injection significantly improved her headaches.    Patient does report residual right arm pain.  She describes her pain as a constant sharp, throbbing, and pins-and-needles sensation.  She rates her pain 7/10 on numeric rating scale.  Admits numbness and tingling along posterior and lateral aspect of right upper arm, right forearm, and into right hand. Admits to the use of TENS unit for symptom management. Patient was prescribed Lyrica on 4/16/2025.  Patient notes experiencing brain fog with the use of this medication. Patient would like to discontinue the use of this medication at this time.    I have personally reviewed and/or updated the patient's past medical history, past surgical history, family history, social history, current medications, allergies, and vital signs today.     Review of Systems   Constitutional:  Negative for fever.   HENT:  Negative for hearing loss.    Eyes:  Negative for visual disturbance.   Respiratory:  Negative for cough.    Cardiovascular:  Negative for leg swelling.   Gastrointestinal:  Negative for abdominal pain and nausea.   Endocrine: Negative for polydipsia.   Genitourinary:  Negative for difficulty urinating.   Musculoskeletal:  Negative for gait problem and myalgias.   Skin:  Negative for rash.   Neurological:  Negative for numbness.   Hematological:  Does not bruise/bleed easily.   Psychiatric/Behavioral:  Negative for dysphoric mood and sleep disturbance.         Patient Active Problem List   Diagnosis    Chronic hypertension affecting pregnancy    Vaccine counseling    Anxiety    Previous pregnancy affected by small for gestational age fetus in first trimester, antepartum    Previous pregnancy complicated by pregnancy-induced hypertension in first trimester, antepartum    Family history of congenital anomalies    Cervical spondylosis    Occipital neuralgia of right side       Past Medical History:   Diagnosis Date    Anxiety     Depressed     Hypertension     Kidney stone     Kidney stones        History reviewed. No pertinent surgical history.    Family History   Problem Relation Age of Onset    Diabetes Mother     Hypertension Mother     Seizures Sister        Social History     Occupational History    Not on file   Tobacco Use    Smoking status: Never    Smokeless tobacco: Never   Substance and Sexual Activity    Alcohol use: Not Currently     Comment: social    Drug use: No    Sexual activity: Yes     Partners: Male     Birth control/protection: Male Sterilization       Current Outpatient Medications on File Prior to Visit   Medication Sig    rizatriptan (MAXALT-MLT) 10 mg disintegrating tablet TAKE 1 TAB AT ONSET OF MIGRAINE , REPEAT IN 2 HOURS IF NEEDED, LIMIT 2 TAB IN 24 HOURS    sertraline (ZOLOFT) 50 mg tablet Take 50 mg by mouth daily    [DISCONTINUED] pregabalin (LYRICA) 75 mg capsule Take 1 capsule (75 mg total) by mouth 2 (two) times a day    amoxicillin-clavulanate (AUGMENTIN) 875-125 mg per tablet Take 1 tablet by mouth 2 (two) times a day (Patient not taking: Reported on 3/27/2025)    butalbital-acetaminophen-caffeine (FIORICET,ESGIC) -40 mg per tablet Take 1 tablet by mouth (Patient not taking: Reported on 4/30/2025)    cyclobenzaprine (FLEXERIL) 5 mg tablet TAKE 1-2 TABLETS BY MOUTH 3 TIMES A DAY AS NEEDED FOR MUSCLE SPASMS. (Patient not taking: Reported on 3/20/2025)    diazepam (VALIUM) 5 mg tablet Take 1 tablet (5 mg total) by mouth 60  "minutes pre-procedure    Diclofenac Sodium (VOLTAREN) 1 % Apply 4 g topically 4 (four) times a day (Patient not taking: Reported on 3/20/2025)    methylPREDNISolone 4 MG tablet therapy pack Use as directed on package    naproxen (NAPROSYN) 500 mg tablet Take 1 tablet (500 mg total) by mouth 2 (two) times a day with meals for 14 days    Norgestrel 0.075 MG TABS Take by mouth (Patient not taking: Reported on 8/26/2024)    ondansetron (ZOFRAN-ODT) 4 mg disintegrating tablet  (Patient not taking: Reported on 10/10/2024)    oseltamivir (TAMIFLU) 75 mg capsule Take 75 mg by mouth 2 (two) times a day    oxyCODONE-acetaminophen (PERCOCET) 5-325 mg per tablet Take 1 tablet by mouth every 4 (four) hours as needed for moderate pain for up to 10 doses Max Daily Amount: 6 tablets    Prenatal Multivit-Min-Fe-FA (PRENATAL 1 + IRON PO)     propranolol (INDERAL) 20 mg tablet     tamsulosin (FLOMAX) 0.4 mg Take 1 capsule by mouth daily with dinner for 7 doses    venlafaxine (EFFEXOR-XR) 75 mg 24 hr capsule Take 75 mg by mouth daily     No current facility-administered medications on file prior to visit.       Allergies   Allergen Reactions    Sulfamethoxazole-Trimethoprim Other (See Comments)       Physical Exam:    Ht 5' 6\" (1.676 m)   Wt 64.4 kg (142 lb)   BMI 22.92 kg/m²     Constitutional:normal, well developed, well nourished, alert, in no distress and non-toxic and no overt pain behavior.  Eyes:anicteric  HEENT:grossly intact  Neck:supple, symmetric, trachea midline and no masses   Pulmonary:even and unlabored  Cardiovascular:No edema or pitting edema present  Skin:Normal without rashes or lesions and well hydrated  Psychiatric:Mood and affect appropriate  Neurologic:Cranial Nerves II-XII grossly intact, bilateral upper extremity strength is normal, negative Le's bilaterally, negative Spurling  Musculoskeletal:normal    Imaging    "

## 2025-05-05 ENCOUNTER — HOSPITAL ENCOUNTER (OUTPATIENT)
Dept: NEUROLOGY | Facility: CLINIC | Age: 30
Discharge: HOME | End: 2025-05-05
Payer: COMMERCIAL

## 2025-05-05 ENCOUNTER — RESULTS FOLLOW-UP (OUTPATIENT)
Dept: PAIN MEDICINE | Facility: MEDICAL CENTER | Age: 30
End: 2025-05-05

## 2025-05-05 DIAGNOSIS — R20.0 RIGHT ARM NUMBNESS: ICD-10-CM

## 2025-05-05 PROCEDURE — 95886 MUSC TEST DONE W/N TEST COMP: CPT | Performed by: PSYCHIATRY & NEUROLOGY

## 2025-05-05 PROCEDURE — 95909 NRV CNDJ TST 5-6 STUDIES: CPT | Performed by: PSYCHIATRY & NEUROLOGY

## 2025-05-29 ENCOUNTER — OFFICE VISIT (OUTPATIENT)
Dept: PAIN MEDICINE | Facility: MEDICAL CENTER | Age: 30
End: 2025-05-29
Payer: COMMERCIAL

## 2025-05-29 VITALS — HEIGHT: 66 IN | WEIGHT: 150 LBS | BODY MASS INDEX: 24.11 KG/M2

## 2025-05-29 DIAGNOSIS — R20.0 NUMBNESS AND TINGLING OF RIGHT UPPER EXTREMITY: Primary | ICD-10-CM

## 2025-05-29 DIAGNOSIS — M47.812 CERVICAL FACET JOINT SYNDROME: ICD-10-CM

## 2025-05-29 DIAGNOSIS — M79.18 MYOFASCIAL PAIN SYNDROME: ICD-10-CM

## 2025-05-29 DIAGNOSIS — M54.81 OCCIPITAL NEURALGIA OF RIGHT SIDE: ICD-10-CM

## 2025-05-29 DIAGNOSIS — R20.2 NUMBNESS AND TINGLING OF RIGHT UPPER EXTREMITY: Primary | ICD-10-CM

## 2025-05-29 PROCEDURE — 99214 OFFICE O/P EST MOD 30 MIN: CPT

## 2025-05-29 RX ORDER — BACLOFEN 10 MG/1
5 TABLET ORAL
Qty: 30 TABLET | Refills: 0 | Status: SHIPPED | OUTPATIENT
Start: 2025-05-29

## 2025-05-29 NOTE — PROGRESS NOTES
Name: Carlyn Treadwell      : 1995      MRN: [id number]  Encounter Provider: Ana Parra PA-C  Encounter Date: 2025   [facility]  :  Assessment & Plan  Numbness and tingling of right upper extremity    Orders:    Ambulatory Referral to Neurology; Future    Myofascial pain syndrome    Orders:    baclofen 10 mg tablet; Take 0.5 tablets (5 mg total) by mouth daily at bedtime as needed for muscle spasms    Occipital neuralgia of right side         Cervical facet joint syndrome           Schedule for right periscapular TPI.  Complete risks and benefits including hyperglycemia, bleeding, infection, local tissue reaction, nerve injury, and allergic reaction were discussed. The approach was demonstrated using models and literature was provided. The patient was agreeable, verbalized an understanding, and wishes to proceed with scheduling the procedure.    Discontinue the use of gabapentin as discussed.    I do not recommend initiating Cymbalta at this time due to patient's daily use of Zoloft.    Patient requesting referral to neurology for further evaluation of numbness and tingling of right upper extremity.  Referral to neurology ordered.    Follow-up after injection, or sooner if needed.    My impressions and treatment recommendations were discussed in detail with the patient who verbalized understanding and had no further questions.  Discharge instructions were provided. I personally saw and examined the patient and I agree with the above discussed plan of care.    History of Present Illness     Carlyn Treadwell is a 29 y.o. female who presents for a follow up office visit in regards to Neck Pain and Arm Pain.  Patient reports worsening of her pain since her last visit on 2025.  Patient continues to locate her pain to the right side of the neck and right upper back.  She describes her pain as a constant sharp, throbbing, and shooting pain.  She rates her  "pain today 7/10 on the numeric rating scale.  Admits radiating pain, numbness, and tingling along the right upper extremity into the right hand.  Denies weakness of bilateral upper extremities.  Admits to the use of Tylenol and ibuprofen for symptom management.    Patient started on gabapentin 300 mg 3 times daily at last visit.  Patient states she currently is taking gabapentin 300 mg nightly but states she is experiencing fatigue and drowsiness with the use of this medication.  Patient requesting discontinuation of gabapentin at this time.    Patient notes previous right C4-6 RFA and right occipital nerve block with significant symptom relief.    Review of Systems   Respiratory:  Negative for shortness of breath.    Cardiovascular:  Negative for chest pain.   Gastrointestinal:  Negative for constipation, diarrhea, nausea and vomiting.   Musculoskeletal:  Positive for arthralgias, myalgias, neck pain and neck stiffness. Negative for gait problem and joint swelling.   Skin:  Negative for rash.   Neurological:  Positive for dizziness and weakness. Negative for seizures.   All other systems reviewed and are negative.      Medical History Reviewed by provider this encounter:     .  Medications Ordered Prior to Encounter[1]      Objective   Ht 5' 6\" (1.676 m)   Wt 68 kg (150 lb)   BMI 24.21 kg/m²      Pain Score:   7  Physical Exam    Constitutional:normal, well developed, well nourished, alert, in no distress and non-toxic and no overt pain behavior.  Eyes:anicteric  HEENT:grossly intact  Neck:supple, symmetric, trachea midline and no masses   Pulmonary:even and unlabored  Cardiovascular:No edema or pitting edema present  Skin:Normal without rashes or lesions and well hydrated  Psychiatric:Mood and affect appropriate  Neurologic:Cranial Nerves II-XII grossly intact, bilateral upper extremity strength is normal, negative El sign bilaterally  Musculoskeletal: Normal, except for tenderness to palpation along the " right periscapular musculature reproducing patient's pain complaint    Radiology Results Review: I have reviewed radiology reports from 4/30/2025 including: EMG.           [1]   Current Outpatient Medications on File Prior to Visit   Medication Sig Dispense Refill    gabapentin (NEURONTIN) 300 mg capsule Take 1 capsule by mouth at bedtime x 3 days.  Then take 1 capsule by mouth twice daily x 3 days.  Then take 1 capsule by mouth 3 times daily. 90 capsule 1    rizatriptan (MAXALT-MLT) 10 mg disintegrating tablet  (Patient taking differently: as needed)      sertraline (ZOLOFT) 50 mg tablet Take 50 mg by mouth in the morning.      amoxicillin-clavulanate (AUGMENTIN) 875-125 mg per tablet Take 1 tablet by mouth 2 (two) times a day (Patient not taking: Reported on 3/27/2025)      butalbital-acetaminophen-caffeine (FIORICET,ESGIC) -40 mg per tablet Take 1 tablet by mouth (Patient not taking: Reported on 4/30/2025)      diazepam (VALIUM) 5 mg tablet Take 1 tablet (5 mg total) by mouth 60 minutes pre-procedure 2 tablet 0    Diclofenac Sodium (VOLTAREN) 1 % Apply 4 g topically 4 (four) times a day (Patient not taking: Reported on 3/20/2025)      methylPREDNISolone 4 MG tablet therapy pack Use as directed on package 21 tablet 0    naproxen (NAPROSYN) 500 mg tablet Take 1 tablet (500 mg total) by mouth 2 (two) times a day with meals for 14 days 28 tablet 0    Norgestrel 0.075 MG TABS Take by mouth (Patient not taking: Reported on 8/26/2024)      ondansetron (ZOFRAN-ODT) 4 mg disintegrating tablet  (Patient not taking: Reported on 10/10/2024)      oseltamivir (TAMIFLU) 75 mg capsule Take 75 mg by mouth 2 (two) times a day      oxyCODONE-acetaminophen (PERCOCET) 5-325 mg per tablet Take 1 tablet by mouth every 4 (four) hours as needed for moderate pain for up to 10 doses Max Daily Amount: 6 tablets 10 tablet 0    Prenatal Multivit-Min-Fe-FA (PRENATAL 1 + IRON PO)       propranolol (INDERAL) 20 mg tablet       tamsulosin  (FLOMAX) 0.4 mg Take 1 capsule by mouth daily with dinner for 7 doses 7 capsule 0    venlafaxine (EFFEXOR-XR) 75 mg 24 hr capsule Take 75 mg by mouth daily      [DISCONTINUED] cyclobenzaprine (FLEXERIL) 5 mg tablet TAKE 1-2 TABLETS BY MOUTH 3 TIMES A DAY AS NEEDED FOR MUSCLE SPASMS. (Patient not taking: Reported on 3/20/2025)       No current facility-administered medications on file prior to visit.

## 2025-06-12 ENCOUNTER — PROCEDURE VISIT (OUTPATIENT)
Dept: PAIN MEDICINE | Facility: MEDICAL CENTER | Age: 30
End: 2025-06-12
Payer: COMMERCIAL

## 2025-06-12 VITALS — WEIGHT: 149.2 LBS | BODY MASS INDEX: 23.98 KG/M2 | HEIGHT: 66 IN

## 2025-06-12 DIAGNOSIS — M79.18 MYOFASCIAL PAIN SYNDROME: Primary | ICD-10-CM

## 2025-06-12 PROCEDURE — 76942 ECHO GUIDE FOR BIOPSY: CPT | Performed by: PHYSICAL MEDICINE & REHABILITATION

## 2025-06-12 PROCEDURE — 20550 NJX 1 TENDON SHEATH/LIGAMENT: CPT | Performed by: PHYSICAL MEDICINE & REHABILITATION

## 2025-06-12 RX ORDER — METHYLPREDNISOLONE ACETATE 40 MG/ML
40 INJECTION, SUSPENSION INTRA-ARTICULAR; INTRALESIONAL; INTRAMUSCULAR; SOFT TISSUE ONCE
Status: COMPLETED | OUTPATIENT
Start: 2025-06-12 | End: 2025-06-12

## 2025-06-12 RX ORDER — BUPIVACAINE HYDROCHLORIDE 2.5 MG/ML
10 INJECTION, SOLUTION EPIDURAL; INFILTRATION; INTRACAUDAL; PERINEURAL ONCE
Status: COMPLETED | OUTPATIENT
Start: 2025-06-12 | End: 2025-06-12

## 2025-06-12 RX ADMIN — BUPIVACAINE HYDROCHLORIDE 10 ML: 2.5 INJECTION, SOLUTION EPIDURAL; INFILTRATION; INTRACAUDAL; PERINEURAL at 15:02

## 2025-06-12 RX ADMIN — METHYLPREDNISOLONE ACETATE 40 MG: 40 INJECTION, SUSPENSION INTRA-ARTICULAR; INTRALESIONAL; INTRAMUSCULAR; SOFT TISSUE at 15:02

## 2025-06-12 NOTE — PROGRESS NOTES
Indication: Right periscapular pain  Preprocedure diagnosis: Myofascial pain of the right rhomboid  Postprocedure diagnosis: Fascial pain of the right rhomboid  Procedure: Ultrasound-guided right rhomboid tendon sheath injection  After discussing the risks, benefits, and alternatives to the procedure, the patient expressed understanding and wished to proceed. The patient was brought to the procedure suite and placed in the prone position. A procedural pause was conducted to verify: correct patient identity, procedure to be performed and as applicable, correct side and site, correct patient position, and availability of implants, special equipment or special requirements. A simple surgical tray was used. Prior to the procedure, the treatment area was examined with a 12-MHz linear transducer to visualize the right rhomboid tendon and determine the optimal needle path. Following this, the region prepared with a alcohol pads, then re-examined using the same transducer, a sterile ultrasound transducer cover, and sterile ultrasound transducer gel. Thereafter, using continuous ultrasound guidance, a 2.5 inch 25 gauge needle was advanced into the tendon sheath. After visualization of the tip in the target area and negative aspiration for blood, a mixture 40 mg Depo-Medrol in 3 mL of 0.25% bupivacaine was injected into the tendon sheath. Following the injection the needle was withdrawn.  The patient tolerated the procedure well and there were no apparent complications. After an appropriate amount of observation, the patient was dismissed from the clinic in good condition under their own power.

## 2025-06-26 ENCOUNTER — TELEPHONE (OUTPATIENT)
Dept: PAIN MEDICINE | Facility: CLINIC | Age: 30
End: 2025-06-26

## 2025-07-06 DIAGNOSIS — M79.18 MYOFASCIAL PAIN SYNDROME: ICD-10-CM

## 2025-07-07 RX ORDER — BACLOFEN 10 MG/1
5 TABLET ORAL
Qty: 30 TABLET | Refills: 0 | Status: SHIPPED | OUTPATIENT
Start: 2025-07-07 | End: 2025-07-18 | Stop reason: SDUPTHER

## 2025-07-07 NOTE — TELEPHONE ENCOUNTER
Left a detailed message as per release of health information, advising pt the same.  Confirmed next appt.  C/B # provided for any questions.

## 2025-07-18 ENCOUNTER — OFFICE VISIT (OUTPATIENT)
Dept: PAIN MEDICINE | Facility: MEDICAL CENTER | Age: 30
End: 2025-07-18
Payer: COMMERCIAL

## 2025-07-18 VITALS — BODY MASS INDEX: 23.95 KG/M2 | WEIGHT: 149 LBS | HEIGHT: 66 IN

## 2025-07-18 DIAGNOSIS — R20.0 NUMBNESS AND TINGLING OF RIGHT UPPER EXTREMITY: ICD-10-CM

## 2025-07-18 DIAGNOSIS — M54.81 OCCIPITAL NEURALGIA OF RIGHT SIDE: Primary | ICD-10-CM

## 2025-07-18 DIAGNOSIS — M47.812 CERVICAL FACET JOINT SYNDROME: ICD-10-CM

## 2025-07-18 DIAGNOSIS — M79.18 MYOFASCIAL PAIN SYNDROME: ICD-10-CM

## 2025-07-18 DIAGNOSIS — R20.2 NUMBNESS AND TINGLING OF RIGHT UPPER EXTREMITY: ICD-10-CM

## 2025-07-18 PROCEDURE — 99214 OFFICE O/P EST MOD 30 MIN: CPT

## 2025-07-18 RX ORDER — BACLOFEN 10 MG/1
5 TABLET ORAL
Qty: 30 TABLET | Refills: 2 | Status: SHIPPED | OUTPATIENT
Start: 2025-07-18

## 2025-07-18 NOTE — PROGRESS NOTES
Name: Carlyn Treadwell      : 1995      MRN: [id number]  Encounter Provider: Ana Parra PA-C  Encounter Date: 2025   Encounter [facility]  :  Assessment & Plan  Occipital neuralgia of right side         Myofascial pain syndrome    Orders:    baclofen 10 mg tablet; Take 0.5 tablets (5 mg total) by mouth daily at bedtime as needed for muscle spasms    Numbness and tingling of right upper extremity         Cervical facet joint syndrome           Schedule for repeat right YOLIE USGI.  Complete risks and benefits including hyperglycemia, bleeding, infection, local tissue reaction, nerve injury, and allergic reaction were discussed. The approach was demonstrated using models and literature was provided. The patient was agreeable, verbalized an understanding, and wishes to proceed with scheduling the procedure.    Continue the use of baclofen as prescribed.  Patient states she tolerates this medication well without side effects.  Refills of this medication were sent to patient's pharmacy today.    I do not recommend initiating Cymbalta at this time due to patient's daily use of Zoloft.    I discussed with patient I believe she would benefit from seeking consultation with neurology.  Patient agreeable.  Referral to neurology previously ordered.  Patient states she will contact neurology this week to set up a consultation appointment.    Follow-up after injection, or sooner if needed.    My impressions and treatment recommendations were discussed in detail with the patient who verbalized understanding and had no further questions.  Discharge instructions were provided. I personally saw and examined the patient and I agree with the above discussed plan of care.    History of Present Illness     Carlyn Treadwell is a 29 y.o. female who presents to [facility] for interval re-evaluation in regards to pain in the Upper back.  Patient recently underwent  "right rhomboid tendon sheath injection on 6/12/2025.  Patient reports experiencing moderate symptom relief lasting approximately 2 weeks prior to her pain returning to baseline.    Patient presents today with pain along the right side of the upper back and right side of base of skull Pain is described as Constant, Dull-aching, Throbbing, and Pins & Needles. Symptoms are worse using the back prolonged. On the numeric pain scale of 1-10, the pain typically increases to max of 4 out of 10, which is currently impacting their quality of life and interferes with their activities of daily living. Admits radiating pain, numbness, and tingling along the right upper extremity into the right hand.  Patient notes recent increase in frequency of right sided headaches. Admits to the use of baclofen with moderate symptom relief.    Patient previously completed trial of gabapentin and Lyrica but unable to tolerate these medications secondary to side effects of drowsiness.    Patient notes previous right several nerve block which provided approximately 95% symptom relief lasting 3 months prior to her pain gradually beginning to return to baseline.    Review of Systems   Constitutional:  Negative for fever.   HENT:  Negative for hearing loss.    Eyes:  Negative for visual disturbance.   Respiratory:  Negative for cough.    Cardiovascular:  Negative for leg swelling.   Gastrointestinal:  Negative for abdominal pain and nausea.   Endocrine: Negative for polydipsia.   Genitourinary:  Negative for difficulty urinating.   Musculoskeletal:  Positive for back pain. Negative for gait problem and myalgias.   Skin:  Negative for rash.   Neurological:  Negative for numbness.   Hematological:  Does not bruise/bleed easily.   Psychiatric/Behavioral:  Negative for dysphoric mood and sleep disturbance.        Medical History Reviewed by provider this encounter:     .  Medications Ordered Prior to Encounter[1]      Objective   Ht 5' 6\" (1.676 m)   " Wt 67.6 kg (149 lb)   BMI 24.05 kg/m²      Pain Score:   4  Physical Exam  Constitutional: normal, well developed, well nourished, alert, in no distress and non-toxic and no overt pain behavior.  Eyes: anicteric  HEENT: grossly intact  Neck: supple, symmetric, trachea midline and no masses   Pulmonary: even and unlabored  Cardiovascular: No edema or pitting edema present  Skin: Normal without rashes or lesions and well hydrated  Psychiatric: Mood and affect appropriate  Neurologic: Cranial Nerves II-XII grossly intact, bilateral upper extremity strength is normal  Musculoskeletal: normal, except for pain in an occipital nerve distribution on the right    Radiology Results Review : No pertinent imaging studies reviewed.         [1]   Current Outpatient Medications on File Prior to Visit   Medication Sig Dispense Refill    rizatriptan (MAXALT-MLT) 10 mg disintegrating tablet       sertraline (ZOLOFT) 50 mg tablet Take 50 mg by mouth in the morning.      [DISCONTINUED] baclofen 10 mg tablet TAKE 0.5 TABLETS (5 MG TOTAL) BY MOUTH DAILY AT BEDTIME AS NEEDED FOR MUSCLE SPASMS 30 tablet 0    amoxicillin-clavulanate (AUGMENTIN) 875-125 mg per tablet Take 1 tablet by mouth 2 (two) times a day (Patient not taking: Reported on 3/27/2025)      butalbital-acetaminophen-caffeine (FIORICET,ESGIC) -40 mg per tablet Take 1 tablet by mouth (Patient not taking: Reported on 7/18/2025)      diazepam (VALIUM) 5 mg tablet Take 1 tablet (5 mg total) by mouth 60 minutes pre-procedure 2 tablet 0    Diclofenac Sodium (VOLTAREN) 1 % Apply 4 g topically 4 (four) times a day (Patient not taking: Reported on 3/20/2025)      gabapentin (NEURONTIN) 300 mg capsule Take 1 capsule by mouth at bedtime x 3 days.  Then take 1 capsule by mouth twice daily x 3 days.  Then take 1 capsule by mouth 3 times daily. 90 capsule 1    methylPREDNISolone 4 MG tablet therapy pack Use as directed on package 21 tablet 0    naproxen (NAPROSYN) 500 mg tablet Take  1 tablet (500 mg total) by mouth 2 (two) times a day with meals for 14 days 28 tablet 0    Norgestrel 0.075 MG TABS Take by mouth (Patient not taking: Reported on 8/26/2024)      ondansetron (ZOFRAN-ODT) 4 mg disintegrating tablet  (Patient not taking: Reported on 10/10/2024)      oseltamivir (TAMIFLU) 75 mg capsule Take 75 mg by mouth 2 (two) times a day      oxyCODONE-acetaminophen (PERCOCET) 5-325 mg per tablet Take 1 tablet by mouth every 4 (four) hours as needed for moderate pain for up to 10 doses Max Daily Amount: 6 tablets 10 tablet 0    Prenatal Multivit-Min-Fe-FA (PRENATAL 1 + IRON PO)       propranolol (INDERAL) 20 mg tablet       tamsulosin (FLOMAX) 0.4 mg Take 1 capsule by mouth daily with dinner for 7 doses 7 capsule 0    venlafaxine (EFFEXOR-XR) 75 mg 24 hr capsule Take 75 mg by mouth daily       No current facility-administered medications on file prior to visit.

## 2025-08-07 ENCOUNTER — PROCEDURE VISIT (OUTPATIENT)
Dept: PAIN MEDICINE | Facility: MEDICAL CENTER | Age: 30
End: 2025-08-07
Payer: COMMERCIAL

## 2025-08-07 VITALS — WEIGHT: 150 LBS | HEIGHT: 66 IN | BODY MASS INDEX: 24.11 KG/M2

## 2025-08-07 DIAGNOSIS — M54.81 OCCIPITAL NEURALGIA OF RIGHT SIDE: Primary | ICD-10-CM

## 2025-08-07 DIAGNOSIS — M79.18 MYOFASCIAL PAIN SYNDROME: ICD-10-CM

## 2025-08-07 PROCEDURE — 76942 ECHO GUIDE FOR BIOPSY: CPT | Performed by: PHYSICAL MEDICINE & REHABILITATION

## 2025-08-07 PROCEDURE — 64405 NJX AA&/STRD GR OCPL NRV: CPT | Performed by: PHYSICAL MEDICINE & REHABILITATION

## 2025-08-07 RX ORDER — METHYLPREDNISOLONE ACETATE 40 MG/ML
40 INJECTION, SUSPENSION INTRA-ARTICULAR; INTRALESIONAL; INTRAMUSCULAR; SOFT TISSUE ONCE
Status: COMPLETED | OUTPATIENT
Start: 2025-08-07 | End: 2025-08-07

## 2025-08-07 RX ORDER — BUPIVACAINE HYDROCHLORIDE 2.5 MG/ML
10 INJECTION, SOLUTION EPIDURAL; INFILTRATION; INTRACAUDAL; PERINEURAL ONCE
Status: COMPLETED | OUTPATIENT
Start: 2025-08-07 | End: 2025-08-07

## 2025-08-07 RX ADMIN — METHYLPREDNISOLONE ACETATE 40 MG: 40 INJECTION, SUSPENSION INTRA-ARTICULAR; INTRALESIONAL; INTRAMUSCULAR; SOFT TISSUE at 15:11

## 2025-08-07 RX ADMIN — BUPIVACAINE HYDROCHLORIDE 10 ML: 2.5 INJECTION, SOLUTION EPIDURAL; INFILTRATION; INTRACAUDAL; PERINEURAL at 15:11

## 2025-08-21 ENCOUNTER — TELEPHONE (OUTPATIENT)
Dept: RADIOLOGY | Facility: MEDICAL CENTER | Age: 30
End: 2025-08-21